# Patient Record
Sex: MALE | Race: WHITE
[De-identification: names, ages, dates, MRNs, and addresses within clinical notes are randomized per-mention and may not be internally consistent; named-entity substitution may affect disease eponyms.]

---

## 2017-04-03 ENCOUNTER — HOSPITAL ENCOUNTER (OUTPATIENT)
Dept: HOSPITAL 33 - SDC | Age: 62
Discharge: HOME | End: 2017-04-03
Attending: SURGERY
Payer: MEDICARE

## 2017-04-03 VITALS — OXYGEN SATURATION: 99 % | SYSTOLIC BLOOD PRESSURE: 142 MMHG | DIASTOLIC BLOOD PRESSURE: 88 MMHG | HEART RATE: 89 BPM

## 2017-04-03 DIAGNOSIS — R22.31: Primary | ICD-10-CM

## 2017-04-03 DIAGNOSIS — R20.8: ICD-10-CM

## 2017-04-03 DIAGNOSIS — E78.5: ICD-10-CM

## 2017-04-03 DIAGNOSIS — I10: ICD-10-CM

## 2017-04-03 DIAGNOSIS — Z79.899: ICD-10-CM

## 2017-04-03 DIAGNOSIS — F41.9: ICD-10-CM

## 2017-04-03 DIAGNOSIS — G47.30: ICD-10-CM

## 2017-04-03 PROCEDURE — 0JQD0ZZ REPAIR RIGHT UPPER ARM SUBCUTANEOUS TISSUE AND FASCIA, OPEN APPROACH: ICD-10-PCS | Performed by: SURGERY

## 2017-04-03 PROCEDURE — 00400 ANES INTEGUMENTARY SYS NOS: CPT

## 2017-04-03 PROCEDURE — 0HBBXZX EXCISION OF RIGHT UPPER ARM SKIN, EXTERNAL APPROACH, DIAGNOSTIC: ICD-10-PCS | Performed by: SURGERY

## 2017-04-03 PROCEDURE — 36415 COLL VENOUS BLD VENIPUNCTURE: CPT

## 2017-04-03 PROCEDURE — 88304 TISSUE EXAM BY PATHOLOGIST: CPT

## 2017-04-03 NOTE — OP
SURGERY DATE:    04/03/17



SURGERY TIME:    1221



PREOPERATIVE DIAGNOSIS:

1.  ENLARGING PERSISTENT RIGHT ARM SUBCUTANEOUS MASS.



POSTOPERATIVE DIAGNOSIS:

1.  ENLARGING PERSISTENT RIGHT ARM SUBCUTANEOUS MASS (LIPOMATOUS DENSITY PATH PENDING).



PROCEDURE:

1.  Excisional biopsy of right upper extremity lipomatous density (approximately 13 cm) 
with intermediate closure.



SURGEON:        Dr. Kalyan Zhu.



ASSISTANT:        Juan Olmstead MS-III.



ANESTHESIA:    General.



ESTIMATED BLOOD LOSS:    Minimal.    



INDICATIONS:  As noted above. Risks and benefits explained in detail, but not limited to. 
Consent was obtained.

     

DESCRIPTION OF PROCEDURE AND FINDINGS:  The patient was taken to the OR. The area had been 
confirmed with the patient in the pre-op holding area. General anesthesia was induced. 
Taken to the OR. Prepped and draped in the usual sterile fashion. After official time-out, 
no disagreement in planned procedure. Marking the small spindle-shaped skin overlying it, 
dissection was carried down through what appeared to be a firmer area and then around what 
appeared to be softer lipomatous density around this. This actually tracked back much more 
posteriorly. Took quite some time, but this lobulated lipomatous density slowly carefully 
freed from the more normal-appearing underlying fashion and normal-appearing subcutaneous 
fat around it. It was stuck right directly underneath the skin and directly posteriorly. 
This was carefully freed. Once this was freed and passed off, it measured about 13 cm. 
Small perforating oozing veins and vessels were ligated with 3-0 Vicryl suture ligature. 
Good hemostasis noted. Wound irrigated out. Subq closed with 3-0 Vicryl tacking it down to 
the underlying fascia was well as possible interrupted in layers. Deep and superficial 
subq closed with 3-0 Vicryl. Skin closed with 4-0 Vicryl. Steri-strips and sterile 
dressing applied. Patient tolerated the procedure well. There were no immediate 
complications. Findings were discussed with his  out in the waiting 
area.

## 2017-04-03 NOTE — HP
DATE OF SURGERY:  04/03/2017



HISTORY OF PRESENT ILLNESS:  The patient is a 61 year-old with increased sized 
subcutaneous mass right arm increasingly symptomatic desires excision.  



PAST MEDICAL HISTORY:  Hypertension, anxiety and autism issues, had some sleep apnea in 
the past. 



PAST SURGICAL HISTORY: Unknown. 



MEDICATIONS:  Senna, Risperidone, omeprazole, Namenda, lisinopril, duloxetine, docusate 
sodium, Depakote extended release. 



ALLERGIES:  NKDA.

 

FAMILY HISTORY: Unknown. 



SOCIAL HISTORY:  No smoking or alcohol abuse.  



REVIEW OF SYSTEMS:  Ten systems reviewed. No chest pain or palpitations.  Other systems 
negative or noncontributory as above and per preadmission questionnaire. 



PHYSICAL EXAMINATION:  

GENERAL:  No acute distress.

HEENT: Sclerae nonicteric.

NECK:  No JVD.

CHEST: Equal excursion, nonlabored breathing. 

CVS: Regular rhythm. 

ABDOMEN: Soft. No peritoneal signs.  

EXTREMITIES:  No significant edema. No cyanosis. Mobile subcutaneous mass right arm. 

NEURO:  Alert, moving extremities symmetrically. No gross motor deficits noted.

 

IMPRESSION:  Enlarging symptomatic right arm subcutaneous mass. I feel the patient would 
benefit from excisional biopsy. Risks and benefits explained in detail but not limited to 
bleeding or infection, risk of hematoma or seroma formation, wound infection, aches, 
pains, risk of deep venous thrombosis, pulmonary embolism, pneumonia, risk of anesthesia 
but not limited to, consent was obtained. Will proceed with excisional biopsy right arm 
mass as an outpatient.

## 2017-04-14 ENCOUNTER — HOSPITAL ENCOUNTER (OUTPATIENT)
Dept: HOSPITAL 33 - ED | Age: 62
Setting detail: OBSERVATION
LOS: 1 days | Discharge: NURSING, REHAB OR LONG-TERM CARE | End: 2017-04-15
Attending: FAMILY MEDICINE | Admitting: FAMILY MEDICINE
Payer: MEDICARE

## 2017-04-14 DIAGNOSIS — R41.89: Primary | ICD-10-CM

## 2017-04-14 DIAGNOSIS — Z79.899: ICD-10-CM

## 2017-04-14 DIAGNOSIS — I10: ICD-10-CM

## 2017-04-14 LAB
ALBUMIN SERPL-MCNC: 3.4 G/DL (ref 3.4–5)
ALP SERPL-CCNC: 69 U/L (ref 46–116)
ALT SERPL-CCNC: 14 U/L (ref 12–78)
ANION GAP SERPL CALC-SCNC: 18.5 MEQ/L (ref 5–15)
APTT PPP: 29.2 SECONDS (ref 24.1–36.1)
AST SERPL QL: 13 U/L (ref 15–37)
BASE EXCESS BLDV CALC-SCNC: -7.2 MMOL/L (ref -2–2)
BASOPHILS NFR BLD AUTO: 0.3 % (ref 0–0.4)
BILIRUB BLD-MCNC: 0.3 MG/DL (ref 0.2–1)
BUN SERPL-MCNC: 17 MG/DL (ref 9–20)
CHLORIDE SERPL-SCNC: 95 MEQ/L (ref 98–107)
CO2 SERPL-SCNC: 19.3 MEQ/L (ref 21–32)
COHGB MFR BLDV: 2.3 % T HGB (ref 0–6.9)
COLLECTION TYPE: (no result)
COMPLETE URINE MICROSCOPIC?: NO
GLUCOSE SERPL-MCNC: 153 MG/DL (ref 70–110)
HCO3 BLDV-SCNC: 18 MEQ/L (ref 22–28)
HGB BLDV-MCNC: 14.2 G/DL
INHALED O2 CONCENTRATION: 21 %
INR PPP: 1.08 (ref 0.8–3)
MCH RBC QN AUTO: 29.8 PG (ref 26–32)
NEUTROPHILS NFR BLD AUTO: 40.9 % (ref 36–66)
PLATELET # BLD AUTO: 195 K/MM3 (ref 150–450)
POTASSIUM BLDV-SCNC: 3.9 MMOL/L (ref 3.5–5.1)
POTASSIUM SERPLBLD-SCNC: 4 MEQ/L (ref 3.5–5.1)
PROT SERPL-MCNC: 6.2 GM/DL (ref 6.4–8.2)
PROTHROMBIN TIME: 12.1 SECONDS (ref 8.83–12.87)
RBC # BLD AUTO: 4.57 M/MM3 (ref 4.1–5.6)
SAO2 % BLDV: 86.9 % (ref 95–100)
SODIUM SERPL-SCNC: 129 MEQ/L (ref 136–145)
WBC # BLD AUTO: 7.6 K/MM3 (ref 4–10.5)

## 2017-04-14 PROCEDURE — 83605 ASSAY OF LACTIC ACID: CPT

## 2017-04-14 PROCEDURE — 82550 ASSAY OF CK (CPK): CPT

## 2017-04-14 PROCEDURE — 51702 INSERT TEMP BLADDER CATH: CPT

## 2017-04-14 PROCEDURE — 80053 COMPREHEN METABOLIC PANEL: CPT

## 2017-04-14 PROCEDURE — 93041 RHYTHM ECG TRACING: CPT

## 2017-04-14 PROCEDURE — 85025 COMPLETE CBC W/AUTO DIFF WBC: CPT

## 2017-04-14 PROCEDURE — 84484 ASSAY OF TROPONIN QUANT: CPT

## 2017-04-14 PROCEDURE — 87086 URINE CULTURE/COLONY COUNT: CPT

## 2017-04-14 PROCEDURE — 96360 HYDRATION IV INFUSION INIT: CPT

## 2017-04-14 PROCEDURE — 96361 HYDRATE IV INFUSION ADD-ON: CPT

## 2017-04-14 PROCEDURE — 36415 COLL VENOUS BLD VENIPUNCTURE: CPT

## 2017-04-14 PROCEDURE — 96365 THER/PROPH/DIAG IV INF INIT: CPT

## 2017-04-14 PROCEDURE — 99285 EMERGENCY DEPT VISIT HI MDM: CPT

## 2017-04-14 PROCEDURE — 82805 BLOOD GASES W/O2 SATURATION: CPT

## 2017-04-14 PROCEDURE — 93005 ELECTROCARDIOGRAM TRACING: CPT

## 2017-04-14 PROCEDURE — 93306 TTE W/DOPPLER COMPLETE: CPT

## 2017-04-14 PROCEDURE — 93268 ECG RECORD/REVIEW: CPT

## 2017-04-14 PROCEDURE — 85610 PROTHROMBIN TIME: CPT

## 2017-04-14 PROCEDURE — 81002 URINALYSIS NONAUTO W/O SCOPE: CPT

## 2017-04-14 PROCEDURE — 85730 THROMBOPLASTIN TIME PARTIAL: CPT

## 2017-04-14 PROCEDURE — 36000 PLACE NEEDLE IN VEIN: CPT

## 2017-04-14 PROCEDURE — 80339 ANTIEPILEPTICS NOS 1-3: CPT

## 2017-04-14 PROCEDURE — 70450 CT HEAD/BRAIN W/O DYE: CPT

## 2017-04-14 PROCEDURE — 87040 BLOOD CULTURE FOR BACTERIA: CPT

## 2017-04-14 PROCEDURE — 71010: CPT

## 2017-04-14 RX ADMIN — PIPERACILLIN SODIUM AND TAZOBACTAM SODIUM SCH MLS/HR: .375; 3 INJECTION, POWDER, LYOPHILIZED, FOR SOLUTION INTRAVENOUS at 18:50

## 2017-04-14 RX ADMIN — RISPERIDONE SCH MG: 1 TABLET ORAL at 21:14

## 2017-04-14 NOTE — XRAY
Indication: Acute mental status change.  Low blood pressure.



Comparison: November 19, 2013.



Portable chest demonstrates right lung volume loss with right hemidiaphragm

elevation and mild translation of the heart/mediastinal structures.  Query

right middle lobe atelectasis.  Remaining heart, left lung, and bony thorax

remarkable.

## 2017-04-14 NOTE — PCM.HP
History of Present Illness





- Chief Complaint


Chief Complaint: seizure disorder; hypotension


Date: 04/14/17


History of Present Illness: 


Mr.TRENT HENRIQUEZ is a 61 year old male.


who is mostly nonverbel and lives in a group home.


He was at [facility] today with the  and was acting his normal 

self with no concerns today reportedly ate a normal lunch and then at [facility] 

suddenly leaned against a display and fell to the ground. He was very pale and 

diaphoretic his eyes were open and wide eyed throughout the time. There was no 

reported posturing or rigidity no incontinence. The episode was thought to last 

about 3 minutes by the  that was with him and when the EMS 

arrived he was back acting like himself. In the ED he was acting like himself 

and not experiencing any type of obvious pain or discomfort. He however was 

significantly hypotensive and this responded to several fluid boluses.  


His case manger states she does not know him to have any seizures in the 3 

years she has known him, and believes the depakote may have been for behaviors 

in the past. 





- Review of Systems


Constitutional: No Fever, No Chills, No Lethargy, No Malaise


Respiratory: No Cough


Cardiac: No Edema


Abdominal/Gastrointestinal: Other (incontinence chronic ), No Vomiting, No 

Diarrhea


Genitourinary Symptoms: Other (incontinence chronic)


Skin: No Cellulitis, No Rash





Medications & Allergies


Home Medications: 


 Home Medication List





Divalproex Sodium [Depakote] 500 mg PO BID 09/03/16 [History Confirmed 04/14/17]


Docusate Sodium 100 mg*** [Colace 100 MG***] 100 mg PO DAILY 09/03/16 [History 

Confirmed 04/14/17]


Duloxetine HCl [Cymbalta] 60 mg PO DAILY 09/03/16 [History Confirmed 04/14/17]


Memantine HCl [Namenda] 10 mg PO DAILY 09/03/16 [History Confirmed 04/14/17]


Omeprazole 20 MG [Prilosec 20 mg] 20 mg PO DAILY 09/03/16 [History Confirmed 04/ 14/17]


Risperidone [Risperdal] 2 mg PO BID 09/03/16 [History Confirmed 04/14/17]


Sennosides [Senna] 8.6 mg PO DAILY 09/03/16 [History Confirmed 04/14/17]


Lisinopril 20 mg*** [Zestril 20 MG***] 20 mg PO DAILY 03/08/17 [History 

Confirmed 04/14/17]


Acetaminophen 325 mg*** [Tylenol 325 mg***] 650 mg PO Q4H PRN PRN 04/14/17 [

History Confirmed 04/14/17]


Chlorpheniramine Maleate [Chlor-Trimeton] 4 mg PO TID PRN PRN 04/14/17 [History 

Confirmed 04/14/17]


Diphenhydramine HCl 25 mg*** [Benadryl 25 mg Capsule***] 25 mg PO Q6H PRN PRN 04 /14/17 [History Confirmed 04/14/17]


Tramadol HCl 50 mg*** [Ultram 50 mg***] 50 mg PO Q6H PRN PRN 04/14/17 [History 

Confirmed 04/14/17]








Allergies/Adverse Reactions: 


 Allergies











Allergy/AdvReac Type Severity Reaction Status Date / Time


 


No Known Drug Allergies Allergy   Verified 04/14/17 15:09














- Past Medical History


Past Medical History: Yes


Neurological History: Dementia


ENT History: No Pertinent History


Cardiac History: Hypertension


Respiratory History: Sleep Apnea


Endocrine Medical History: No Pertinent History


Musculoskelatal History: Fractures


GI Medical History: GERD


 History: No Pertinent History


Pyscho-Social History: Anxiety, Depression, Other


Male Reproductive Disorders: No Pertinent History


Comment: right shoulder injury.  autistic, intellectual diability.  dementia.  

limited verbal communication





- Past Surgical History


Past Surgical History: Yes


Neuro Surgical History: No Pertinent History


Cardiac History: No Pertinent History


Respiratory Surgery: No Pertinent History


GI Surgical History: No Pertinent History


Genitourinary Surgical Hx: No Pertinent History


Musculskeletal Surgical Hx: Orthopedic Surgery


Male Surgical History: No Pertinent History


Other Surgical History: ankle surgery- pin from fracture





- Social History


Smoking Status: Never smoker


Exposure to second hand smoke: No


Alcohol: None


Drug Use: none


Significant Family History: no pertinent family hx





- Physical Exam


Vital Signs: 


 Vital Signs - 24 hr











  Temp Pulse Resp BP Pulse Ox


 


 04/14/17 20:00   83  16  127/75  100


 


 04/14/17 18:02  97.6 F  83  16  127/79  100


 


 04/14/17 16:15      94 L


 


 04/14/17 15:59   89  22  118/61 


 


 04/14/17 15:20   93 H  18  94/51  98


 


 04/14/17 15:05  96.8 F    


 


 04/14/17 14:50   99 H  20  87/51  100


 


 04/14/17 14:36     137/59 


 


 04/14/17 14:19   107 H  20  117/79  95


 


 04/14/17 14:13  98.6 F     94 L


 


 04/14/17 14:05  98.6 F  118 H  20  77/38  94 L











General Appearance: no apparent distress


Neurologic Exam: other (he does not appear to have any focal deficits he is 

active moving around in bed.  he makes his typical signs by patting his stomach 

noting he is thirsty and says the word pop repeatedly as he usually does when 

he wants a drink and hits his side when he is upset and strums a guitar because 

he was suppose to whatch someone play tonight. Those are his main three signs 

he does also point to areas and say pain if they hurt but is not doing that now.

)


Eye Exam: No scleral icterus, No pale conjunctivae


Ears, Nose, Throat Exam: moist mucous membranes, No pharyngeal erythema


Neck Exam: No non-tender, No supple


Respiratory Exam: normal breath sounds, No respiratory distress


Cardiovascular Exam: regular rate/rhythm, normal heart sounds, normal 

peripheral pulses, No edema


Gastrointestinal/Abdomen Exam: soft, normal bowel sounds, No tenderness, No 

distention


Extremity Exam: normal inspection, No calf tenderness


Skin Exam: warm, dry, No rash





Results





- Labs


Lab/Micro Results: 


 Lab Results-Last 24 Hours











  04/14/17 Range/Units





  18:24 


 


Troponin I  < 0.017  (0.000-0.056)  ng/ml














Assessment/Plan


(1) Hypotension


Current Visit: Yes   Status: Acute   


Assessment & Plan: 


he has responded to the fluid bolus


hold his lisinopril


monitor for evidence of infection


it seems he may have had a seizure. 


his depakote is in the therapeutic range


he is back to his baseline currently


preliminary results of echo show normal EF and mild MR, TR, AI, PI only with 

official read pending


observe on telemetry overnight and if remains at baseline without evidence of 

infection will discharge back to group home on previous medications. 


Code(s): I95.9 - HYPOTENSION, UNSPECIFIED   





(2) Cognitive impairment


Current Visit: Yes   Status: Chronic   Code(s): R41.89 - OT SYMPTOMS AND SIGNS 

W COGNITIVE FUNCTIONS AND AWARENESS

## 2017-04-14 NOTE — ERPHSYRPT
- History of Present Illness


Time Seen by Provider: 04/14/17 13:45


[facility]


Patient Subjective Stated Complaint: CAREGIVER STATES THEY WERE STANDING IN 

LINE AT [facility] AND PATIENT BECAME VERY WEAK AND SAT DOWN ON FLOOR.  EMS STATES 

PATIENT WAS ASSISTED TO COT, VERY WEAK.


Triage Nursing Assessment: ARRIVES PER EMS VIA COT.  SKIN C/D, COLOR PALE, RESP 

NONLABORED.  PATIENT IS NONVERBAL NORMALLY.  RESIDES AT GROUP HOME.  HX OF 

MODERATE MENTAL RETARDATION AND AUTISM.  PATIENT UNABLE TO EXPRESS ANY 

SYMPTOMS.  IS ALERT AND FOLLOWS COMMANDS.  HR  SR.


Physician History: 





CC: trouble breathing/spell


Hx: 60 y/o male patient who lives at [facility]. He is known to be nonverbal with 

hx of dev delay and autism. He has chronic chewing and rocking behaviors. He 

had a lipoma removed from right upper arm under general earlier this week per 

Dr Zhu.





He was shopping at the [facility] with his caregiver. Was acting normally. Ate a 

good lunch at the [facility]. While in check out line he had a spell where he 

stared off and had trouble breathing. No real seizure motions. Seemed to not be 

responsive as usual. Was clammy and diaphoretic. No vomiting or diarrhea. No 

injury.





Pt unable to give hx. EMS gave IVF, alb neb.


Timing/Duration: today


Severity: moderate, severe


Allergies/Adverse Reactions: 








No Known Drug Allergies Allergy (Verified 04/14/17 15:09)


 





Home Medications: 








Divalproex Sodium [Depakote] 500 mg PO BID 09/03/16 [History]


Docusate Sodium 100 mg*** [Colace 100 MG***] 100 mg PO DAILY 09/03/16 [History]


Duloxetine HCl [Cymbalta] 60 mg PO DAILY 09/03/16 [History]


Memantine HCl [Namenda] 10 mg PO DAILY 09/03/16 [History]


Omeprazole 20 MG [Prilosec 20 mg] 20 mg PO DAILY 09/03/16 [History]


Risperidone [Risperdal] 2 mg PO BID 09/03/16 [History]


Sennosides [Senna] 8.6 mg PO DAILY 09/03/16 [History]


Lisinopril 20 mg*** [Zestril 20 MG***] 20 mg PO DAILY 03/08/17 [History]





Hx Tetanus, Diphtheria Vaccination/Date Given: Yes


Hx Influenza Vaccination/Date Given: Yes


Hx Pneumococcal Vaccination/Date Given: Yes





- Review of Systems


Constitutional: No Fever


Abdominal/Gastrointestinal: No Vomiting, No Diarrhea


Neurological: No Seizure


All Other Systems: Unable due to condition





- Past Medical History


Pertinent Past Medical History: Yes


Neurological History: Dementia


ENT History: No Pertinent History


Cardiac History: Hypertension


Respiratory History: Sleep Apnea


Endocrine Medical History: No Pertinent History


Musculoskeletal History: Fractures


GI Medical History: GERD


 History: No Pertinent History


Psycho-Social History: Anxiety, Depression, Other


Male Reproductive Disorders: No Pertinent History


Other Medical History: right shoulder injury.  autistic, intellectual 

diability.  dementia.  limited verbal communication





- Past Surgical History


Past Surgical History: Yes


Neuro Surgical History: No Pertinent History


Cardiac: No Pertinent History


Respiratory: No Pertinent History


Gastrointestinal: No Pertinent History


Genitourinary: No Pertinent History


Musculoskeletal: Orthopedic Surgery


Male Surgical History: No Pertinent History


Other Surgical History: ankle surgery- pin from fracture





- Social History


Smoking Status: Never smoker


Exposure to second hand smoke: No


Alcohol Use: None


Drug Use: none


Patient Lives Alone: No


Significant Family History: no pertinent family hx





- Nursing Vital Signs


Nursing Vital Signs: 


 Initial Vital Signs











Temperature                    96.8 F


 


Temperature Source             Rectal


 


Pulse Rate                     89


 


Respiratory Rate               22


 


Blood Pressure [Left Arm]      118/61


 


Pain Intensity                 0

















- Physical Exam


General Appearance: alert, other (nonverbal)


Eye Exam: other (pupils equal and small to mid sized)


Ears, Nose, Throat Exam: normal ENT inspection, dry mucous membranes


Neck Exam: normal inspection, supple


Respiratory Exam: diminished breath sounds, rhonchi


Cardiovascular Exam: regular rate/rhythm, tachycardia


Gastrointestinal/Abdomen Exam: soft, No tenderness, No distention


Extremity Exam: normal inspection, other (right upper arm has steristripped 

wound without redness, swelling or drng)


Neurologic Exam: alert, other (rocking and chewing behavior reported to be his 

norm per the caregiver), No motor deficits


Skin Exam: warm, dry, No rash


**SpO2 Interpretation**: normal


SpO2: 94


Oxygen Delivery: Room Air





- Course


Nursing assessment & vital signs reviewed: Yes


EKG Interpreted by Me: RATE (105), Sinus Tach, NORMAL AXIS, Q-wave (inferior 

small), Non-specific ST Changes





- Radiology Exams


  ** cxr


X-ray Interpretation: Teleradiologist Report (RML atelectasis with volume loss)





- CT Exams


  ** head


CT Interpretation: Tele-radiologist Report (no acute, mild hydrocephalus)


Ordered Tests: 


 Active Orders 24 hr











 Category Date Time Status


 


 Cardiac Monitor STAT Care  04/14/17 13:47 Active


 


 Cath for Specimen-Straight STAT Care  04/14/17 13:52 Active


 


 EKG-ER Only STAT Care  04/14/17 13:45 Active


 


 Taylor [Catheter-Shelley Taylor] STAT Care  04/14/17 14:33 Active


 


 IV Insertion STAT Care  04/14/17 13:45 Active


 


 IV Insertion-2nd Peripheral STAT Care  04/14/17 14:19 Active


 


 Pulse Oximetry (ED) STAT Care  04/14/17 13:47 Active


 


 Rectal Temperature STAT Care  04/14/17 13:47 Active


 


 CHEST 1 VIEW (PORTABLE) Stat Exams  04/14/17 13:46 Completed


 


 ECHO W/2D AND DOPPLER [US] Stat Exams  04/14/17 15:06 Taken


 


 HEAD WITHOUT CONTRAST [CT] Stat Exams  04/14/17 13:47 Completed


 


 BLOOD CULTURE Stat Lab  04/14/17 13:55 Received


 


 CBC W DIFF Stat Lab  04/14/17 13:50 Completed


 


 CK-Creatinine Phosphokinase Stat Lab  04/14/17 14:44 Completed


 


 CMP Stat Lab  04/14/17 13:50 Completed


 


 CULTURE,URINE Stat Lab  04/14/17 13:52 Ordered


 


 Lactic Acid Urgent Lab  04/14/17 14:00 Completed


 


 Lactic Acid Urgent Lab  04/14/17 15:55 Completed


 


 PROTIME WITH INR Stat Lab  04/14/17 13:50 Completed


 


 PTT Stat Lab  04/14/17 13:50 Completed


 


 TROPONIN Q3H Lab  04/14/17 13:50 Completed


 


 TROPONIN Q3H Lab  04/14/17 18:15 Ordered


 


 TROPONIN Q3H Lab  04/14/17 21:15 Ordered


 


 TROPONIN Q3H Lab  04/15/17 00:15 Ordered


 


 TROPONIN Q3H Lab  04/15/17 03:15 Ordered


 


 UA Stat Lab  04/14/17 13:46 Completed


 


 VALPROIC ACID (DEPAKOTE) Stat Lab  04/14/17 13:50 Completed


 


 VENOUS BLOOD GAS Urgent Lab  04/14/17 14:00 Completed








Medication Summary














Discontinued Medications














Generic Name Dose Route Start Last Admin





  Trade Name Freq  PRN Reason Stop Dose Admin


 


Sodium Chloride  1,000 mls @ 999 mls/hr  04/14/17 13:45  04/14/17 14:31





  Sodium Chloride 0.9% 1000 Ml  IV  04/14/17 14:45  999 mls/hr





  .Q1H1M STA   Administration


 


Sodium Chloride  Confirm  04/14/17 13:53  





  Sodium Chloride 0.9% 1000 Ml  Administered  04/14/17 13:54  





  Dose   





  1,000 mls @ ud   





  .ROUTE   





  .STK-MED ONE   


 


Piperacillin Sod/Tazobactam Sod  100 mls @ 100 mls/hr  04/14/17 14:06  04/14/17 

14:31





  Zosyn 3.375gm/100 Ml D5w  IV  04/14/17 15:05  100 mls/hr





  STAT ONE   Administration


 


Sodium Chloride  1,000 mls @ 999 mls/hr  04/14/17 14:08  04/14/17 14:50





  Sodium Chloride 0.9% 1000 Ml  IV  04/14/17 15:08  999 mls/hr





  .Q1H1M STA   Administration


 


Sodium Chloride  1,000 mls @ 999 mls/hr  04/14/17 14:08  04/14/17 15:02





  Sodium Chloride 0.9% 1000 Ml  IV  04/14/17 15:08  Not Given





  .Q1H1M STA   


 


Piperacillin Sod/Tazobactam Sod  Confirm  04/14/17 14:26  





  Zosyn 3.375gm/100 Ml D5w  Administered  04/14/17 14:27  





  Dose   





  100 mls @ ud   





  IV   





  .STK-MED ONE   


 


Sodium Chloride  Confirm  04/14/17 14:49  





  Sodium Chloride 0.9% 1000 Ml  Administered  04/14/17 14:50  





  Dose   





  1,000 mls @ ud   





  .ROUTE   





  .STK-MED ONE   











Lab/Rad Data: 


 Laboratory Result Diagrams





 04/14/17 13:50 





 04/14/17 13:50 





 Laboratory Results











  04/14/17 04/14/17 04/14/17 Range/Units





  15:55 14:44 14:00 


 


WBC     (4.0-10.5)  K/mm3


 


RBC     (4.1-5.6)  M/mm3


 


Hgb     (12.5-18.0)  gm/dl


 


Hct     (42-50)  %


 


MCV     ()  fl


 


MCH     (26-32)  pg


 


MCHC     (32-36)  g/dl


 


RDW     (11.5-14.0)  %


 


Plt Count     (150-450)  K/mm3


 


MPV     (6-9.5)  fl


 


Gran %     (36.0-66.0)  %


 


Lymphocytes %     (24.0-44.0)  %


 


Monocytes %     (0.0-12.0)  %


 


Eosinophils %     (0.00-5.0)  %


 


Basophils %     (0.0-0.4)  %


 


Basophils #     (0-0.4)  


 


INR     (0.8-3.0)  


 


PTT     (24.1-36.1)  SECONDS


 


VBG pH    7.32  (7.32-7.42)  


 


VBG pCO2 at Pat Temp    35 L  (42-55)  mm/Hg


 


VBG pO2 at Pat Temp    48 H  (25-40)  mm/Hg


 


VBG HCO3    18.0 L  (22-28)  meq/L


 


VBG O2 Sat (Juana)    86.9 L  ()  


 


VBG Base Excess    -7.2 L  (-2.0-2.0)  


 


VBG Hemoglobin    14.2  


 


VBG Carboxyhemoglobin    2.3  (0.0-6.9)  % T HGB


 


POC Potassium    3.9  (3.5-5.1)  


 


Sodium     (136-145)  mEq/L


 


Potassium     (3.5-5.1)  mEq/L


 


Chloride     ()  mEq/L


 


Carbon Dioxide     (21-32)  mEq/L


 


Anion Gap     (5-15)  MEQ/L


 


BUN     (9-20)  mg/dL


 


Creatinine     (0.55-1.30)  mg/dl


 


Estimated GFR     ML/MIN


 


Glucose     ()  MG/DL


 


Lactic Acid  1.9    (0.4-2.0)  


 


Calcium     (8.5-10.1)  mg/dL


 


Total Bilirubin     (0.2-1.0)  mg/dL


 


AST     (15-37)  U/L


 


ALT     (12-78)  U/L


 


Alkaline Phosphatase     ()  U/L


 


Creatine Kinase   58   ()  U/L


 


Troponin I     (0.000-0.056)  ng/ml


 


Serum Total Protein     (6.4-8.2)  gm/dL


 


Albumin     (3.4-5.0)  g/dL


 


Ur Collection Type     


 


Urine Color     (YELLOW)  


 


Urine Appearance     (CLEAR)  


 


Urine pH     (5-6)  


 


Ur Specific Gravity     (1.005-1.025)  


 


Urine Protein     (Negative)  


 


Urine Glucose (UA)     (NEGATIVE)  mg/dL


 


Urine Ketones     (NEGATIVE)  


 


Urine Nitrite     (NEGATIVE)  


 


Urine Bilirubin     (NEGATIVE)  


 


Urine Urobilinogen     (0-1)  mg/dL


 


Urine WBC (Auto)     (NEGATIVE)  


 


Urine RBC (Auto)     (0-5)  Tramaine/ul


 


Valproic Acid     ()  UG/ML


 


Specimen Received     














  04/14/17 04/14/17 04/14/17 Range/Units





  14:00 13:50 13:50 


 


WBC     (4.0-10.5)  K/mm3


 


RBC     (4.1-5.6)  M/mm3


 


Hgb     (12.5-18.0)  gm/dl


 


Hct     (42-50)  %


 


MCV     ()  fl


 


MCH     (26-32)  pg


 


MCHC     (32-36)  g/dl


 


RDW     (11.5-14.0)  %


 


Plt Count     (150-450)  K/mm3


 


MPV     (6-9.5)  fl


 


Gran %     (36.0-66.0)  %


 


Lymphocytes %     (24.0-44.0)  %


 


Monocytes %     (0.0-12.0)  %


 


Eosinophils %     (0.00-5.0)  %


 


Basophils %     (0.0-0.4)  %


 


Basophils #     (0-0.4)  


 


INR     (0.8-3.0)  


 


PTT     (24.1-36.1)  SECONDS


 


VBG pH     (7.32-7.42)  


 


VBG pCO2 at Pat Temp     (42-55)  mm/Hg


 


VBG pO2 at Pat Temp     (25-40)  mm/Hg


 


VBG HCO3     (22-28)  meq/L


 


VBG O2 Sat (Juana)     ()  


 


VBG Base Excess     (-2.0-2.0)  


 


VBG Hemoglobin     


 


VBG Carboxyhemoglobin     (0.0-6.9)  % T HGB


 


POC Potassium     (3.5-5.1)  


 


Sodium     (136-145)  mEq/L


 


Potassium     (3.5-5.1)  mEq/L


 


Chloride     ()  mEq/L


 


Carbon Dioxide     (21-32)  mEq/L


 


Anion Gap     (5-15)  MEQ/L


 


BUN     (9-20)  mg/dL


 


Creatinine     (0.55-1.30)  mg/dl


 


Estimated GFR     ML/MIN


 


Glucose     ()  MG/DL


 


Lactic Acid  5.1 H    (0.4-2.0)  


 


Calcium     (8.5-10.1)  mg/dL


 


Total Bilirubin     (0.2-1.0)  mg/dL


 


AST     (15-37)  U/L


 


ALT     (12-78)  U/L


 


Alkaline Phosphatase     ()  U/L


 


Creatine Kinase     ()  U/L


 


Troponin I   < 0.017   (0.000-0.056)  ng/ml


 


Serum Total Protein     (6.4-8.2)  gm/dL


 


Albumin     (3.4-5.0)  g/dL


 


Ur Collection Type     


 


Urine Color     (YELLOW)  


 


Urine Appearance     (CLEAR)  


 


Urine pH     (5-6)  


 


Ur Specific Gravity     (1.005-1.025)  


 


Urine Protein     (Negative)  


 


Urine Glucose (UA)     (NEGATIVE)  mg/dL


 


Urine Ketones     (NEGATIVE)  


 


Urine Nitrite     (NEGATIVE)  


 


Urine Bilirubin     (NEGATIVE)  


 


Urine Urobilinogen     (0-1)  mg/dL


 


Urine WBC (Auto)     (NEGATIVE)  


 


Urine RBC (Auto)     (0-5)  Tramaine/ul


 


Valproic Acid    69.5  ()  UG/ML


 


Specimen Received     














  04/14/17 04/14/17 04/14/17 Range/Units





  13:50 13:50 13:50 


 


WBC    7.6  (4.0-10.5)  K/mm3


 


RBC    4.57  (4.1-5.6)  M/mm3


 


Hgb    13.6  (12.5-18.0)  gm/dl


 


Hct    38.9 L  (42-50)  %


 


MCV    85.1  ()  fl


 


MCH    29.8  (26-32)  pg


 


MCHC    35.0  (32-36)  g/dl


 


RDW    12.8  (11.5-14.0)  %


 


Plt Count    195  (150-450)  K/mm3


 


MPV    9.4  (6-9.5)  fl


 


Gran %    40.9  (36.0-66.0)  %


 


Lymphocytes %    50.2 H  (24.0-44.0)  %


 


Monocytes %    7.5  (0.0-12.0)  %


 


Eosinophils %    1.1  (0.00-5.0)  %


 


Basophils %    0.3  (0.0-0.4)  %


 


Basophils #    0.02  (0-0.4)  


 


INR  1.08    (0.8-3.0)  


 


PTT  29.2    (24.1-36.1)  SECONDS


 


VBG pH     (7.32-7.42)  


 


VBG pCO2 at Pat Temp     (42-55)  mm/Hg


 


VBG pO2 at Pat Temp     (25-40)  mm/Hg


 


VBG HCO3     (22-28)  meq/L


 


VBG O2 Sat (Juana)     ()  


 


VBG Base Excess     (-2.0-2.0)  


 


VBG Hemoglobin     


 


VBG Carboxyhemoglobin     (0.0-6.9)  % T HGB


 


POC Potassium     (3.5-5.1)  


 


Sodium   129 L   (136-145)  mEq/L


 


Potassium   4.0   (3.5-5.1)  mEq/L


 


Chloride   95 L   ()  mEq/L


 


Carbon Dioxide   19.3 L   (21-32)  mEq/L


 


Anion Gap   18.5 H   (5-15)  MEQ/L


 


BUN   17   (9-20)  mg/dL


 


Creatinine   1.41 H   (0.55-1.30)  mg/dl


 


Estimated GFR   54   ML/MIN


 


Glucose   153 H   ()  MG/DL


 


Lactic Acid     (0.4-2.0)  


 


Calcium   8.7   (8.5-10.1)  mg/dL


 


Total Bilirubin   0.3   (0.2-1.0)  mg/dL


 


AST   13 L   (15-37)  U/L


 


ALT   14   (12-78)  U/L


 


Alkaline Phosphatase   69   ()  U/L


 


Creatine Kinase     ()  U/L


 


Troponin I     (0.000-0.056)  ng/ml


 


Serum Total Protein   6.2 L   (6.4-8.2)  gm/dL


 


Albumin   3.4   (3.4-5.0)  g/dL


 


Ur Collection Type     


 


Urine Color     (YELLOW)  


 


Urine Appearance     (CLEAR)  


 


Urine pH     (5-6)  


 


Ur Specific Gravity     (1.005-1.025)  


 


Urine Protein     (Negative)  


 


Urine Glucose (UA)     (NEGATIVE)  mg/dL


 


Urine Ketones     (NEGATIVE)  


 


Urine Nitrite     (NEGATIVE)  


 


Urine Bilirubin     (NEGATIVE)  


 


Urine Urobilinogen     (0-1)  mg/dL


 


Urine WBC (Auto)     (NEGATIVE)  


 


Urine RBC (Auto)     (0-5)  Tramaine/ul


 


Valproic Acid     ()  UG/ML


 


Specimen Received     














  04/14/17 Range/Units





  13:46 


 


WBC   (4.0-10.5)  K/mm3


 


RBC   (4.1-5.6)  M/mm3


 


Hgb   (12.5-18.0)  gm/dl


 


Hct   (42-50)  %


 


MCV   ()  fl


 


MCH   (26-32)  pg


 


MCHC   (32-36)  g/dl


 


RDW   (11.5-14.0)  %


 


Plt Count   (150-450)  K/mm3


 


MPV   (6-9.5)  fl


 


Gran %   (36.0-66.0)  %


 


Lymphocytes %   (24.0-44.0)  %


 


Monocytes %   (0.0-12.0)  %


 


Eosinophils %   (0.00-5.0)  %


 


Basophils %   (0.0-0.4)  %


 


Basophils #   (0-0.4)  


 


INR   (0.8-3.0)  


 


PTT   (24.1-36.1)  SECONDS


 


VBG pH   (7.32-7.42)  


 


VBG pCO2 at Pat Temp   (42-55)  mm/Hg


 


VBG pO2 at Pat Temp   (25-40)  mm/Hg


 


VBG HCO3   (22-28)  meq/L


 


VBG O2 Sat (Juana)   ()  


 


VBG Base Excess   (-2.0-2.0)  


 


VBG Hemoglobin   


 


VBG Carboxyhemoglobin   (0.0-6.9)  % T HGB


 


POC Potassium   (3.5-5.1)  


 


Sodium   (136-145)  mEq/L


 


Potassium   (3.5-5.1)  mEq/L


 


Chloride   ()  mEq/L


 


Carbon Dioxide   (21-32)  mEq/L


 


Anion Gap   (5-15)  MEQ/L


 


BUN   (9-20)  mg/dL


 


Creatinine   (0.55-1.30)  mg/dl


 


Estimated GFR   ML/MIN


 


Glucose   ()  MG/DL


 


Lactic Acid   (0.4-2.0)  


 


Calcium   (8.5-10.1)  mg/dL


 


Total Bilirubin   (0.2-1.0)  mg/dL


 


AST   (15-37)  U/L


 


ALT   (12-78)  U/L


 


Alkaline Phosphatase   ()  U/L


 


Creatine Kinase   ()  U/L


 


Troponin I   (0.000-0.056)  ng/ml


 


Serum Total Protein   (6.4-8.2)  gm/dL


 


Albumin   (3.4-5.0)  g/dL


 


Ur Collection Type  CLEAN CATCH  


 


Urine Color  YELLOW  (YELLOW)  


 


Urine Appearance  CLEAR  (CLEAR)  


 


Urine pH  6.5  (5-6)  


 


Ur Specific Gravity  1.020  (1.005-1.025)  


 


Urine Protein  NEGATIVE  (Negative)  


 


Urine Glucose (UA)  NEGATIVE  (NEGATIVE)  mg/dL


 


Urine Ketones  NEGATIVE  (NEGATIVE)  


 


Urine Nitrite  NEGATIVE  (NEGATIVE)  


 


Urine Bilirubin  NEGATIVE  (NEGATIVE)  


 


Urine Urobilinogen  0.2  (0-1)  mg/dL


 


Urine WBC (Auto)  NEGATIVE  (NEGATIVE)  


 


Urine RBC (Auto)  NEGATIVE  (0-5)  Tramaine/ul


 


Valproic Acid   ()  UG/ML


 


Specimen Received  4/14/17 1345  














- Progress


Progress Note: 





04/14/17 15:11


SEPSIS REASSESSMENT: 


T 96.8 rectal; HR 99; RR 20; 100% sat; 87/51 BP


He is alert, chronically nonverbal. Heart regular and less tachycardic. 

Respirations easy without distress and mildly diminished lungs. Skin pink and 

warm with cap refill 2 seconds. Radial pulses 2+ bilateral. Pedal pulses 

thready 1+.





Unsure etiology of his spell and his hypotension. Cultures sent and will cover 

for sepsis although focal source not found. Will get stat echo. He will need 

admission. 30ml/kg IVF bolus in progress.





04/14/17 16:14


BP was as low as 60's. He seems to have improved. ?seizure as etiology. Called 

Dr FADY Justice for ICU observation. 


Will see patient in: hospital (observation)


Counseled pt/family regarding: lab results, diagnosis, need for follow-up, rad 

results





- Departure


Time of Disposition: 16:14


Departure Disposition: Observation


Clinical Impression: 


 Seizure disorder, Hypotension





Condition: Fair


Critical Care Time: Yes


Critical Care Time(excluding separately billable procedures): 30-74 minutes

## 2017-04-14 NOTE — XRAY
Indication: Altered mental status.



Multiple contiguous images obtained through the head without contrast with the

head manually secured.



Comparison: January 12, 2011.



Again age-appropriate global atrophy and minimal periventricular degenerative

micro-ischemia.  The ventricular system remains diffusely prominent as before.

 Cannot completely exclude communicating type hydrocephalus.  No acute

intracranial hemorrhage, abnormal extra-axial fluid collection, or mass

effect.  Bony calvarium intact.  Visualized paranasal sinuses and mastoid air

cells are pneumatized and clear.



Impression:

1.  Stable nonacute senile brain.

2.  Ventricular system remains diffusely prominent.  Cannot completely exclude

communicating type hydrocephalus in the right clinical setting.



CTDI 59.95

## 2017-04-15 VITALS — OXYGEN SATURATION: 98 %

## 2017-04-15 VITALS — SYSTOLIC BLOOD PRESSURE: 156 MMHG | DIASTOLIC BLOOD PRESSURE: 99 MMHG | HEART RATE: 76 BPM

## 2017-04-15 LAB
ALBUMIN SERPL-MCNC: 3.2 G/DL (ref 3.4–5)
ALP SERPL-CCNC: 64 U/L (ref 46–116)
ALT SERPL-CCNC: 9 U/L (ref 12–78)
ANION GAP SERPL CALC-SCNC: 8.6 MEQ/L (ref 5–15)
AST SERPL QL: 12 U/L (ref 15–37)
BASOPHILS NFR BLD AUTO: 0.2 % (ref 0–0.4)
BILIRUB BLD-MCNC: 0.3 MG/DL (ref 0.2–1)
BUN SERPL-MCNC: 8 MG/DL (ref 9–20)
CHLORIDE SERPL-SCNC: 103 MEQ/L (ref 98–107)
CO2 SERPL-SCNC: 27.5 MEQ/L (ref 21–32)
GLUCOSE SERPL-MCNC: 89 MG/DL (ref 70–110)
MCH RBC QN AUTO: 29.9 PG (ref 26–32)
NEUTROPHILS NFR BLD AUTO: 48.9 % (ref 36–66)
PLATELET # BLD AUTO: 164 K/MM3 (ref 150–450)
POTASSIUM SERPLBLD-SCNC: 4 MEQ/L (ref 3.5–5.1)
PROT SERPL-MCNC: 5.9 GM/DL (ref 6.4–8.2)
RBC # BLD AUTO: 4.22 M/MM3 (ref 4.1–5.6)
SODIUM SERPL-SCNC: 135 MEQ/L (ref 136–145)
WBC # BLD AUTO: 6.4 K/MM3 (ref 4–10.5)

## 2017-04-15 RX ADMIN — PIPERACILLIN SODIUM AND TAZOBACTAM SODIUM SCH MLS/HR: .375; 3 INJECTION, POWDER, LYOPHILIZED, FOR SOLUTION INTRAVENOUS at 00:09

## 2017-04-15 RX ADMIN — PIPERACILLIN SODIUM AND TAZOBACTAM SODIUM SCH MLS/HR: .375; 3 INJECTION, POWDER, LYOPHILIZED, FOR SOLUTION INTRAVENOUS at 12:04

## 2017-04-15 RX ADMIN — RISPERIDONE SCH MG: 1 TABLET ORAL at 10:41

## 2017-04-15 RX ADMIN — PIPERACILLIN SODIUM AND TAZOBACTAM SODIUM SCH MLS/HR: .375; 3 INJECTION, POWDER, LYOPHILIZED, FOR SOLUTION INTRAVENOUS at 06:16

## 2017-04-15 NOTE — PCM.DS
Discharge Summary


Date of Admission: 


04/14/17 17:01





Admitting Physician: 


SONIA BLAND





Primary Care Provider: 


SONIA BLAND








Allergies


Allergies





No Known Drug Allergies Allergy (Verified 04/14/17 15:09)


 











Hospital Summary





- Hospital Course


Hospital Course: 





Pt admitted after episode at [facility] in which he was slumped and staring.  

Initial BP low at 77 systolic but since then have been wnl.  His initial 

lactate was elevated to 5.1 but repeat x2 wnl.  He was observed overnight on 

telemetry with no issues.  UA and labs without acute findings.  Hyponatremia 

initially but improved (Na 135 on discharge). WBC wnl throughout. Blood and 

urine cultures pending. Possibly pt had a seizure episode. Dr. CLARK Bland to 

follow up after discharge. Pt asking to be discharged to home "Doctor...Home."  

Will discharge today.





- Vitals & Intake/Output


Vital Signs: 





 Vital Signs











Temperature  98.2 F   04/15/17 10:00


 


Pulse Rate  61   04/15/17 10:00


 


Respiratory Rate  22   04/15/17 10:00


 


Blood Pressure  151/90   04/15/17 10:00


 


O2 Sat by Pulse Oximetry  98   04/15/17 10:00











Intake & Output: 





 Intake & Output











 04/13/17 04/14/17 04/15/17 04/16/17





 11:59 11:59 11:59 11:59


 


Intake Total   1243 


 


Output Total   3450 


 


Balance   -2207 


 


Weight   92.4 kg 














- Lab


Result Diagrams: 


 04/15/17 05:35





 04/15/17 05:35


Lab Results-Last 24 Hrs: 





 Lab Results-Last 24 Hours











  04/14/17 04/14/17 04/15/17 Range/Units





  18:24 21:37 00:37 


 


WBC     (4.0-10.5)  K/mm3


 


RBC     (4.1-5.6)  M/mm3


 


Hgb     (12.5-18.0)  gm/dl


 


Hct     (42-50)  %


 


MCV     ()  fl


 


MCH     (26-32)  pg


 


MCHC     (32-36)  g/dl


 


RDW     (11.5-14.0)  %


 


Plt Count     (150-450)  K/mm3


 


MPV     (6-9.5)  fl


 


Gran %     (36.0-66.0)  %


 


Lymphocytes %     (24.0-44.0)  %


 


Monocytes %     (0.0-12.0)  %


 


Eosinophils %     (0.00-5.0)  %


 


Basophils %     (0.0-0.4)  %


 


Basophils #     (0-0.4)  


 


Sodium     (136-145)  mEq/L


 


Potassium     (3.5-5.1)  mEq/L


 


Chloride     ()  mEq/L


 


Carbon Dioxide     (21-32)  mEq/L


 


Anion Gap     (5-15)  MEQ/L


 


BUN     (9-20)  mg/dL


 


Creatinine     (0.55-1.30)  mg/dl


 


Estimated GFR     ML/MIN


 


Glucose     ()  MG/DL


 


Calcium     (8.5-10.1)  mg/dL


 


Total Bilirubin     (0.2-1.0)  mg/dL


 


AST     (15-37)  U/L


 


ALT     (12-78)  U/L


 


Alkaline Phosphatase     ()  U/L


 


Troponin I  < 0.017  < 0.017  < 0.017  (0.000-0.056)  ng/ml


 


Serum Total Protein     (6.4-8.2)  gm/dL


 


Albumin     (3.4-5.0)  g/dL














  04/15/17 04/15/17 04/15/17 Range/Units





  03:05 05:35 05:35 


 


WBC   6.4   (4.0-10.5)  K/mm3


 


RBC   4.22   (4.1-5.6)  M/mm3


 


Hgb   12.6   (12.5-18.0)  gm/dl


 


Hct   36.5 L   (42-50)  %


 


MCV   86.5   ()  fl


 


MCH   29.9   (26-32)  pg


 


MCHC   34.5   (32-36)  g/dl


 


RDW   12.8   (11.5-14.0)  %


 


Plt Count   164   (150-450)  K/mm3


 


MPV   8.9   (6-9.5)  fl


 


Gran %   48.9   (36.0-66.0)  %


 


Lymphocytes %   39.7   (24.0-44.0)  %


 


Monocytes %   10.4   (0.0-12.0)  %


 


Eosinophils %   0.8   (0.00-5.0)  %


 


Basophils %   0.2   (0.0-0.4)  %


 


Basophils #   0.01   (0-0.4)  


 


Sodium    135 L  (136-145)  mEq/L


 


Potassium    4.0  (3.5-5.1)  mEq/L


 


Chloride    103  ()  mEq/L


 


Carbon Dioxide    27.5  (21-32)  mEq/L


 


Anion Gap    8.6  (5-15)  MEQ/L


 


BUN    8 L  (9-20)  mg/dL


 


Creatinine    0.94  (0.55-1.30)  mg/dl


 


Estimated GFR    > 60  ML/MIN


 


Glucose    89  ()  MG/DL


 


Calcium    8.2 L  (8.5-10.1)  mg/dL


 


Total Bilirubin    0.3  (0.2-1.0)  mg/dL


 


AST    12 L  (15-37)  U/L


 


ALT    9 L  (12-78)  U/L


 


Alkaline Phosphatase    64  ()  U/L


 


Troponin I  < 0.017    (0.000-0.056)  ng/ml


 


Serum Total Protein    5.9 L  (6.4-8.2)  gm/dL


 


Albumin    3.2 L  (3.4-5.0)  g/dL














Discharge Exam


General Appearance: no apparent distress


Neurologic Exam: alert, cooperative (follows some instructions)


Skin Exam: normal color, warm, dry


Eye Exam: other (no erythema or exudates)


Neck Exam: normal inspection, non-tender, No lymphadenopathy


Respiratory Exam: normal breath sounds, lungs clear, No crackles/rales, No 

rhonchi, No wheezing


Cardiovascular Exam: regular rate/rhythm, normal heart sounds


Gastrointestinal/Abdomen Exam: soft, normal bowel sounds, No tenderness, No 

distention, No mass, No guarding


Extremity Exam: No pedal edema, No swelling


Back Exam: normal inspection, No CVA tenderness





Final Diagnosis/Problem List





- Final Discharge Diagnosis/Problem


(1) Seizure disorder


Current Visit: Yes   Status: Acute   


Assessment & Plan: 


Likely the cause of yesterday's episode.  Pt without sx overnight and currently 

doing well. D/c today.








(2) Cognitive impairment


Current Visit: Yes   Status: Chronic   





(3) Hyponatremia


Current Visit: Yes   Status: Acute   


Assessment & Plan: 


Improved since admission.  Recheck outpatient per Dr. CLARK Bland.








- Discharge


Disposition: DC TO ANY "OTHER" NURSING HOME


Condition: Fair


Prescriptions: 


Continue


   Risperidone [Risperdal] 2 mg PO BID


   Omeprazole 20 MG [Prilosec 20 mg] 20 mg PO DAILY


   Memantine HCl [Namenda] 10 mg PO DAILY


   Duloxetine HCl [Cymbalta] 60 mg PO DAILY


   Docusate Sodium 100 mg*** [Colace 100 MG***] 100 mg PO DAILY


   Divalproex Sodium [Depakote] 500 mg PO BID


   Sennosides [Senna] 8.6 mg PO DAILY


   Lisinopril 20 mg*** [Zestril 20 MG***] 20 mg PO DAILY


   Diphenhydramine HCl 25 mg*** [Benadryl 25 mg Capsule***] 25 mg PO Q6H PRN PRN


     PRN Reason: Allergies


   Chlorpheniramine Maleate [Chlor-Trimeton] 4 mg PO TID PRN PRN


     PRN Reason: allergy symptoms


   Acetaminophen 325 mg*** [Tylenol 325 mg***] 650 mg PO Q4H PRN PRN


     PRN Reason: pain/fever


   Tramadol HCl 50 mg*** [Ultram 50 mg***] 50 mg PO Q6H PRN PRN


     PRN Reason: Pain


Follow up with: 


SONIA BLAND [Primary Care Provider] -

## 2018-11-03 ENCOUNTER — HOSPITAL ENCOUNTER (EMERGENCY)
Dept: HOSPITAL 33 - ED | Age: 63
Discharge: HOME | End: 2018-11-03
Payer: MEDICARE

## 2018-11-03 VITALS — HEART RATE: 80 BPM | OXYGEN SATURATION: 98 %

## 2018-11-03 VITALS — SYSTOLIC BLOOD PRESSURE: 140 MMHG | DIASTOLIC BLOOD PRESSURE: 77 MMHG

## 2018-11-03 DIAGNOSIS — Z79.899: ICD-10-CM

## 2018-11-03 DIAGNOSIS — F71: ICD-10-CM

## 2018-11-03 DIAGNOSIS — S00.83XA: Primary | ICD-10-CM
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
## 2018-11-03 DIAGNOSIS — S16.1XXA: ICD-10-CM

## 2018-11-03 LAB
ANION GAP SERPL CALC-SCNC: 14 MEQ/L (ref 5–15)
BASOPHILS # BLD AUTO: 0.02 10*3/UL (ref 0–0.4)
BASOPHILS NFR BLD AUTO: 0.3 % (ref 0–0.4)
BUN SERPL-MCNC: 21 MG/DL (ref 9–20)
CALCIUM SPEC-MCNC: 8.8 MG/DL (ref 8.4–10.2)
CHLORIDE SERPL-SCNC: 95 MMOL/L (ref 98–107)
CO2 SERPL-SCNC: 26 MMOL/L (ref 22–30)
CREAT SERPL-MCNC: 0.86 MG/DL (ref 0.66–1.25)
EOSINOPHIL # BLD AUTO: 0.06 10*3/UL (ref 0–0.5)
GLUCOSE SERPL-MCNC: 109 MG/DL (ref 74–106)
GRANULOCYTES # BLD AUTO: 2.93 10*3/UL (ref 1.4–6.9)
HCT VFR BLD AUTO: 37.3 % (ref 42–50)
HGB BLD-MCNC: 12.8 GM/DL (ref 12.5–18)
LYMPHOCYTES # SPEC AUTO: 2.7 10*3/UL (ref 1–4.6)
MCH RBC QN AUTO: 30 PG (ref 26–32)
MCHC RBC AUTO-ENTMCNC: 34.3 G/DL (ref 32–36)
MONOCYTES # BLD AUTO: 0.46 10*3/UL (ref 0–1.3)
NEUTROPHILS NFR BLD AUTO: 47.4 % (ref 36–66)
PLATELET # BLD AUTO: 217 K/MM3 (ref 150–450)
POTASSIUM SERPLBLD-SCNC: 3.9 MMOL/L (ref 3.5–5.1)
RBC # BLD AUTO: 4.27 M/MM3 (ref 4.1–5.6)
SODIUM SERPL-SCNC: 130 MMOL/L (ref 137–145)
WBC # BLD AUTO: 6.2 K/MM3 (ref 4–10.5)

## 2018-11-03 PROCEDURE — 70450 CT HEAD/BRAIN W/O DYE: CPT

## 2018-11-03 PROCEDURE — 72125 CT NECK SPINE W/O DYE: CPT

## 2018-11-03 PROCEDURE — 80048 BASIC METABOLIC PNL TOTAL CA: CPT

## 2018-11-03 PROCEDURE — 85025 COMPLETE CBC W/AUTO DIFF WBC: CPT

## 2018-11-03 PROCEDURE — 80164 ASSAY DIPROPYLACETIC ACD TOT: CPT

## 2018-11-03 PROCEDURE — 36415 COLL VENOUS BLD VENIPUNCTURE: CPT

## 2018-11-03 PROCEDURE — 93005 ELECTROCARDIOGRAM TRACING: CPT

## 2018-11-03 PROCEDURE — 99284 EMERGENCY DEPT VISIT MOD MDM: CPT

## 2018-11-03 NOTE — ERPHSYRPT
- History of Present Illness


Time Seen by Provider: 11/03/18 20:35


Source: patient


Exam Limitations: clinical condition


Patient Subjective Stated Complaint: pt is alert and oriented. pt is mentally 

disabled male in room with caretaker. pt has a reddened sheared looking area to 

his left frontal area above the eyebrow. and a small sheared area to upper 

nose. pt is not able to communicate pain, or what happened. care taker is 

unsure of incident as well. PERRLA.


Triage Nursing Assessment: see above


Physician History: 





PATIENT WITH A HISTORY OF MODERATE RETARDATION, DEMENTIA, HYPERTENSION, SEIZURE 

DISORDER, CAREGIVER STATES PATIENT FELL, FOUND ON FLOOR, ABRASIONS TO FOREHEAD.

  PATIENT HAD NO LOSS OF CONSCIOUSNESS, EMESIS OR LETHARGY. HAS ABRASIONS TO 

FOREHEAD. PATIENT IS NONVERBAL.


Occurred: just prior to arrival


Reason for Fall: unknown


Injuries/Pain Location: head (STAFF FOUND PATIENT LYING ONTO FLOOR)


Loss of Consciousness: no loss of consciousness


Associated Symptoms (Fall): denies symptoms (UNABLE TO EVALUATE PATIENT'S PAIN 

STATUS DUE TO NONVERBAL)


Allergies/Adverse Reactions: 








No Known Drug Allergies Allergy (Verified 04/14/17 15:09)


 





Home Medications: 








Divalproex Sodium [Depakote] 500 mg PO BID 09/03/16 [History]


Docusate Sodium 100 mg*** [Colace 100 MG***] 100 mg PO DAILY 09/03/16 [History]


Duloxetine HCl [Cymbalta] 60 mg PO DAILY 09/03/16 [History]


Memantine HCl [Namenda] 10 mg PO DAILY 09/03/16 [History]


Omeprazole 20 MG [Prilosec 20 mg] 20 mg PO DAILY 09/03/16 [History]


Risperidone [Risperdal] 2 mg PO BID 09/03/16 [History]


Sennosides [Senna] 8.6 mg PO DAILY 09/03/16 [History]


Lisinopril 20 mg*** [Zestril 20 MG***] 20 mg PO DAILY 03/08/17 [History]


Acetaminophen 325 mg*** [Tylenol 325 mg***] 650 mg PO Q4H PRN PRN 04/14/17 [

History]


Chlorpheniramine Maleate [Chlor-Trimeton] 4 mg PO TID PRN PRN 04/14/17 [History]


Diphenhydramine HCl 25 mg*** [Benadryl 25 mg Capsule***] 25 mg PO Q6H PRN PRN 04 /14/17 [History]


Tramadol HCl 50 mg*** [Ultram 50 mg***] 50 mg PO Q6H PRN PRN 04/14/17 [History]





Hx Tetanus, Diphtheria Vaccination/Date Given: Yes


Hx Influenza Vaccination/Date Given: Yes


Hx Pneumococcal Vaccination/Date Given: Yes


Immunizations Up to Date: Yes





- Review of Systems


Constitutional: No Fever, No Chills


Eyes: No Symptoms


Ears, Nose, & Throat: No Symptoms


Respiratory: No Symptoms, No Cough, No Dyspnea


Cardiac: No Symptoms, No Chest Pain, No Edema, No Syncope


Abdominal/Gastrointestinal: No Symptoms, No Abdominal Pain, No Nausea, No 

Vomiting, No Diarrhea


Genitourinary Symptoms: No Symptoms, No Dysuria


Musculoskeletal: No Symptoms, No Back Pain, No Neck Pain


Skin: No Rash


Neurological: No Dizziness, No Focal Weakness, No Sensory Changes


Psychological: No Symptoms


Endocrine: No Symptoms


All Other Systems: Unable due to dementia (PATIENT IS NONVERBAL)





- Past Medical History


Pertinent Past Medical History: Yes


Neurological History: Dementia


ENT History: No Pertinent History


Cardiac History: Hypertension


Respiratory History: Sleep Apnea


Endocrine Medical History: No Pertinent History


Musculoskeletal History: Fractures


GI Medical History: GERD


 History: No Pertinent History


Psycho-Social History: Anxiety, Depression, Other


Male Reproductive Disorders: No Pertinent History


Other Medical History: right shoulder injury.  autistic, intellectual 

diability.  dementia.  limited verbal communication





- Past Surgical History


Past Surgical History: Yes


Neuro Surgical History: No Pertinent History


Cardiac: No Pertinent History


Respiratory: No Pertinent History


Gastrointestinal: No Pertinent History


Genitourinary: No Pertinent History


Musculoskeletal: Orthopedic Surgery


Male Surgical History: No Pertinent History


Other Surgical History: ankle surgery- pin from fracture





- Social History


Smoking Status: Never smoker


Exposure to second hand smoke: No


Alcohol Use: None


Drug Use: none


Patient Lives Alone: No


Significant Family History: no pertinent family hx





- Nursing Vital Signs


Nursing Vital Signs: 


 Initial Vital Signs











Pulse Rate  111 H  11/03/18 20:28


 


Respiratory Rate  18   11/03/18 20:28


 


Blood Pressure  140/77   11/03/18 20:28


 


O2 Sat by Pulse Oximetry  97   11/03/18 20:28














- Cait Coma Score


Best Eye Response (Cait): (4) open spontaneously


Best Motor Response ([place]): (6) obeys commands





- Physical Exam


General Appearance: no apparent distress, alert


Head Injury: tenderness (FOREHEAD ABRASIONS,  MEASURING 2CM X 5CM OVER LEFT 

LATERAL ASPECT OF FOREHEAD)


Eye Exam: PERRL/EOMI


ENT Exam: airway nml


Neck Exam: supple, full range of motion, normal inspection, No tenderness


Respiratory/Chest Exam: normal breath sounds, No chest tenderness, No 

respiratory distress


Cardiovascular Exam: normal heart sounds, regular rate/rhythm


Gastrointestinal Exam: soft, normal bowel sounds (NONTENDER), No tenderness, No 

distention, No guarding, No ecchymosis


Back Exam: normal inspection, No vertebral tenderness


Extremity Exam: normal inspection, normal range of motion (MOVES ALL EXTEMITIES 

WELL,  NO TENDERNESS OR CREPITUS, SWELLING OR ECCHYMOSIS OVER SHOULDERS, ELBOW, 

WRIST, GREATER TROCHANTERS AND KNEES), pelvis stable, No deformities


Peripheral Pulses: carotid (R): 2+, carotid (L): 2+, femoral (R): 2+, femoral (L

): 2+, dorsalis-pedis (R): 2+, dorsalis-pedis (L): 2+


Neurologic Exam: alert, oriented x 3, cooperative, sensation nml, No motor 

deficits


Skin Exam: normal color, warm, dry


SpO2: 97


Oxygen Delivery: Room Air





- Course


EKG Interpreted by Me: RATE, Sinus Rhythm, Sinus Tach (RATE OF 98 ANTERIOR 

LATERAL FLAT T-WAVES), NORMAL AXIS





- CT Exams


  ** Head


CT Interpretation: Tele-radiologist Report, No/Intracranial Hemorrhag (THERE IS 

MODERATE CEREBRAL ATROPHY)





  ** Cervical Spine


CT Interpretation: Tele-radiologist Report (MODERATE DEGENERATIVE CHANGES), DJD

, No Fracture, No Subluxation


Ordered Tests: 


 Active Orders 24 hr











 Category Date Time Status


 


 EKG-ER Only STAT Care  11/03/18 20:52 Active


 


 CERVICAL SPINE WO CONTRAST [CT] Stat Exams  11/03/18 20:52 Taken


 


 HEAD WITHOUT CONTRAST [CT] Stat Exams  11/03/18 20:49 Taken


 


 BMP Stat Lab  11/03/18 21:06 Completed


 


 CBC W DIFF Stat Lab  11/03/18 21:06 Completed











Lab/Rad Data: 


 Laboratory Result Diagrams





 11/03/18 21:06 





 11/03/18 21:06 





 Laboratory Results











  11/03/18 11/03/18 11/03/18 Range/Units





  21:06 21:06 21:06 


 


WBC    6.2  (4.0-10.5)  K/mm3


 


RBC    4.27  (4.1-5.6)  M/mm3


 


Hgb    12.8  (12.5-18.0)  gm/dl


 


Hct    37.3 L  (42-50)  %


 


MCV    87.4  ()  fl


 


MCH    30.0  (26-32)  pg


 


MCHC    34.3  (32-36)  g/dl


 


RDW    13.0  (11.5-14.0)  %


 


Plt Count    217  (150-450)  K/mm3


 


MPV    8.7  (6-9.5)  fl


 


Gran %    47.4  (36.0-66.0)  %


 


Eos # (Auto)    0.06  (0-0.5)  


 


Absolute Lymphs (auto)    2.70  (1.0-4.6)  


 


Absolute Monos (auto)    0.46  (0.0-1.3)  


 


Lymphocytes %    43.8  (24.0-44.0)  %


 


Monocytes %    7.5  (0.0-12.0)  %


 


Eosinophils %    1.0  (0.00-5.0)  %


 


Basophils %    0.3  (0.0-0.4)  %


 


Absolute Granulocytes    2.93  (1.4-6.9)  


 


Basophils #    0.02  (0-0.4)  


 


Sodium   130 L   (137-145)  mmol/L


 


Potassium   3.9   (3.5-5.1)  mmol/L


 


Chloride   95 L   ()  mmol/L


 


Carbon Dioxide   26   (22-30)  mmol/L


 


Anion Gap   14.0   (5-15)  MEQ/L


 


BUN   21 H   (9-20)  mg/dL


 


Creatinine   0.86   (0.66-1.25)  mg/dL


 


Estimated GFR   > 60.0   ML/MIN


 


Glucose   109 H   ()  mg/dL


 


Calcium   8.8   (8.4-10.2)  mg/dL


 


Valproic Acid  71.7    ()  ug/mL














- Progress


Progress Note: 





11/03/18 21:58


PATIENT TETNUS IS CURRENT








- Departure


Time of Disposition: 22:30


Departure Disposition: Home


Clinical Impression: 


 FOREHEAD CONTUSION/ABRASIONS, ACUTE CERVICAL STRAIN





Condition: Stable


Critical Care Time: No


Referrals: 


SONIA BLAND [Primary Care Provider] - 


Additional Instructions: 


GIVE OVER THE COUNTER TYLENOL EVERY 4 HOURS AS NEEDED FOR PAIN.  FOLLOW HEAD 

INJURY INSTRUCTIONS.  CLEANSE FOREHEAD ABRASIONS WITH SOAP AND WATER TWICE DAILY

, FOLLOWED BY APPLICATION OF BACITRACIN OINTMENT TWICE DAILY FOR 10 DAYS.  

WATCH FOR SIGNS OF INFECTION, REDNESS, SWELLING OR DRAINAGE.

## 2019-06-22 ENCOUNTER — HOSPITAL ENCOUNTER (EMERGENCY)
Dept: HOSPITAL 33 - ED | Age: 64
Discharge: HOME | End: 2019-06-22
Payer: MEDICARE

## 2019-06-22 VITALS — DIASTOLIC BLOOD PRESSURE: 83 MMHG | OXYGEN SATURATION: 97 % | SYSTOLIC BLOOD PRESSURE: 120 MMHG | HEART RATE: 85 BPM

## 2019-06-22 DIAGNOSIS — G40.909: ICD-10-CM

## 2019-06-22 DIAGNOSIS — F32.9: ICD-10-CM

## 2019-06-22 DIAGNOSIS — S09.93XA: ICD-10-CM

## 2019-06-22 DIAGNOSIS — F41.9: ICD-10-CM

## 2019-06-22 DIAGNOSIS — I10: ICD-10-CM

## 2019-06-22 DIAGNOSIS — Z79.899: ICD-10-CM

## 2019-06-22 DIAGNOSIS — F03.90: ICD-10-CM
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
## 2019-06-22 DIAGNOSIS — K21.9: ICD-10-CM

## 2019-06-22 DIAGNOSIS — S19.9XXA: Primary | ICD-10-CM

## 2019-06-22 PROCEDURE — 70450 CT HEAD/BRAIN W/O DYE: CPT

## 2019-06-22 PROCEDURE — 70486 CT MAXILLOFACIAL W/O DYE: CPT

## 2019-06-22 PROCEDURE — 99284 EMERGENCY DEPT VISIT MOD MDM: CPT
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
